# Patient Record
Sex: MALE | Race: BLACK OR AFRICAN AMERICAN | NOT HISPANIC OR LATINO | Employment: OTHER | URBAN - METROPOLITAN AREA
[De-identification: names, ages, dates, MRNs, and addresses within clinical notes are randomized per-mention and may not be internally consistent; named-entity substitution may affect disease eponyms.]

---

## 2022-04-18 PROBLEM — I25.84 CORONARY ARTERY CALCIFICATION: Status: ACTIVE | Noted: 2022-04-18

## 2024-05-20 ENCOUNTER — OFFICE VISIT (OUTPATIENT)
Dept: SLEEP MEDICINE | Facility: MEDICAL CENTER | Age: 69
End: 2024-05-20
Attending: NURSE PRACTITIONER
Payer: MEDICARE

## 2024-05-20 VITALS
BODY MASS INDEX: 24.5 KG/M2 | RESPIRATION RATE: 16 BRPM | DIASTOLIC BLOOD PRESSURE: 68 MMHG | OXYGEN SATURATION: 93 % | WEIGHT: 175 LBS | HEIGHT: 71 IN | SYSTOLIC BLOOD PRESSURE: 110 MMHG | HEART RATE: 93 BPM

## 2024-05-20 DIAGNOSIS — Z87.891 FORMER SMOKER: ICD-10-CM

## 2024-05-20 DIAGNOSIS — J43.9 PULMONARY EMPHYSEMA, UNSPECIFIED EMPHYSEMA TYPE (HCC): ICD-10-CM

## 2024-05-20 DIAGNOSIS — J44.9 CHRONIC OBSTRUCTIVE PULMONARY DISEASE, UNSPECIFIED COPD TYPE (HCC): ICD-10-CM

## 2024-05-20 DIAGNOSIS — G47.33 OSA (OBSTRUCTIVE SLEEP APNEA): ICD-10-CM

## 2024-05-20 PROCEDURE — 99214 OFFICE O/P EST MOD 30 MIN: CPT | Performed by: NURSE PRACTITIONER

## 2024-05-20 PROCEDURE — 3074F SYST BP LT 130 MM HG: CPT | Performed by: NURSE PRACTITIONER

## 2024-05-20 PROCEDURE — 3078F DIAST BP <80 MM HG: CPT | Performed by: NURSE PRACTITIONER

## 2024-05-20 ASSESSMENT — FIBROSIS 4 INDEX: FIB4 SCORE: 2.41

## 2024-05-20 ASSESSMENT — PATIENT HEALTH QUESTIONNAIRE - PHQ9: CLINICAL INTERPRETATION OF PHQ2 SCORE: 0

## 2024-05-20 NOTE — PROGRESS NOTES
Chief Complaint   Patient presents with    Follow-Up     Last seen 03/13/2024 Dr. Schneider  Compliance check       HPI:  Jack Cui is a 69 y.o. year old male here today for follow-up on COPD.  Last seen 3/13/2024 for sleep study results.  Past medical history includes COPD, hypertension, DSL, CAD, and is a former smoker with a 43-pack-year history quit in 2015.  Last seen in pulmonary clinic by Dorothy Sharp on 10/10/2023 .split-night study showed evidence of JUAN JOSE with an AHI of 13.2 and O2 jay of 79%.  Patient was titrated on CPAP.  Order was placed for auto CPAP 6 to 10 cm/H2O with supplemental oxygen bleed in at 2 L/min.     Regards to COPD, patient continues to use Trelegy daily with rare use of his rescue inhaler.  Has completed pulmonary rehab in the past.  Recently received his CPAP and notes improvement in sleep quality and absence of symptoms with use.  Using CPAP with supplemental oxygen bleed in.  Symptomatically, patient only complains of throat congestion and uses Mucinex.  Denies cough, wheezing, chest tightness or new or worsening dyspnea or shortness of breath.  Current mMRC of 1.  Denies any exacerbations since last visit.  Please follow for lung cancer screening program.  Patient is due for CT chest in 1 month.  Order will be placed.    ROS: As per HPI and otherwise negative if not stated.    Past Medical History:   Diagnosis Date    Back pain     Chest tightness     Constipation     Cough     Diarrhea     Dizziness     Frequent headaches     Hyperlipidemia     Hypertension     Morning headache     Ringing in ears     Shortness of breath     Sputum production     Wears glasses     Wheezing        No past surgical history on file.    Family History   Problem Relation Age of Onset    Cancer Mother        Allergies as of 05/20/2024    (No Known Allergies)        Vitals:  /68 (BP Location: Left arm, Patient Position: Sitting, BP Cuff Size: Adult)   Pulse 93   Resp 16   Ht 1.803 m  "(5' 11\")   Wt 79.4 kg (175 lb)   SpO2 93%     Current medications as of today   Current Outpatient Medications   Medication Sig Dispense Refill    fluticasone-umeclidinium-vilanterol (TRELEGY ELLIPTA) 100-62.5-25 mcg/act inhaler Inhale 1 Puff every day. 90 Each 3    amLODIPine (Katerzia, Norvasc) 1 mg/mL oral suspension Take  by mouth every day. Shake well, room temperature      atorvastatin (LIPITOR) 40 MG Tab Take 1 Tablet by mouth every day. 90 Tablet 2    aspirin (ASPIRIN ADULT LOW STRENGTH) 81 MG EC tablet Take 1 Tablet by mouth every day. 90 Tablet 2    zolpidem (AMBIEN) 5 MG Tab Take 1 Tablet by mouth at bedtime as needed for Sleep (For Sleep Study only) for up to 1 dose. 1 tablet makes a one day dose.  Indications: Trouble Sleeping (Patient not taking: Reported on 3/13/2024) 1 Tablet 0    Guaifenesin 1200 MG TABLET SR 12 HR Take 1 Tablet by mouth every 12 hours. (Patient not taking: Reported on 1/10/2024) 28 Tablet 0    albuterol 108 (90 Base) MCG/ACT Aero Soln inhalation aerosol Inhale 1-2 Puffs every four hours as needed for Shortness of Breath. (Patient not taking: Reported on 1/10/2024) 1 Each 3     No current facility-administered medications for this visit.         Physical Exam:   Gen:           Alert and oriented, No apparent distress. Mood and affect appropriate, normal interaction with examiner.  Eyes:          PERRL, EOM intact, sclere white, conjunctive moist.  Ears:          Not examined.   Hearing:     Grossly intact.  Nose:          Normal, no lesions or deformities.  Dentition:    Good dentition.  Oropharynx:   Tongue normal, posterior pharynx without erythema or exudate.  Neck:        Supple, trachea midline, no masses.  Respiratory Effort: No intercostal retractions or use of accessory muscles.   Lung Auscultation:      Clear to auscultation bilaterally; no rales, rhonchi or wheezing.  CV:            Regular rate and rhythm. No murmurs, rubs or gallops.  Abd:           Not examined. "   Lymphadenopathy: Not examined.  Gait and Station: Normal.  Digits and Nails: No clubbing, cyanosis, petechiae, or nodes.   Cranial Nerves: II-XII grossly intact.  Skin:        No rashes, lesions or ulcers noted.               Ext:           No cyanosis or edema.      Assessment:  1. JUAN JOSE (obstructive sleep apnea)  DME Mask and Supplies      2. Chronic obstructive pulmonary disease, unspecified COPD type (HCC)  CT-LUNG CANCER-SCREENING      3. Pulmonary emphysema, unspecified emphysema type (HCC)  CT-LUNG CANCER-SCREENING      4. Former smoker  CT-LUNG CANCER-SCREENING        Plan:  Patient recently issued CPAP.  Follow-up in 2 days for first compliance check with Dr. Schneider.  Continues to use and benefit from Trelegy and albuterol if needed.  Also uses Mucinex for congestion.  Order placed for CT chest due in 1 month.  Patient will follow-up in 6 months.  Medication and inhaler use discussed in detail with patient.  Denies any new or worsening dyspnea or shortness of breath or exacerbations since last visit.    Please note that this dictation was created using voice recognition software. I have made every reasonable attempt to correct obvious errors, but it is possible there are errors of grammar and possibly content that I did not discover before finalizing the note.

## 2024-05-21 ENCOUNTER — TELEPHONE (OUTPATIENT)
Dept: HEALTH INFORMATION MANAGEMENT | Facility: OTHER | Age: 69
End: 2024-05-21

## 2024-05-22 ENCOUNTER — OFFICE VISIT (OUTPATIENT)
Dept: SLEEP MEDICINE | Facility: MEDICAL CENTER | Age: 69
End: 2024-05-22
Attending: PREVENTIVE MEDICINE
Payer: MEDICARE

## 2024-05-22 VITALS
DIASTOLIC BLOOD PRESSURE: 68 MMHG | BODY MASS INDEX: 24.5 KG/M2 | SYSTOLIC BLOOD PRESSURE: 120 MMHG | HEIGHT: 71 IN | WEIGHT: 175 LBS | HEART RATE: 78 BPM | OXYGEN SATURATION: 95 % | RESPIRATION RATE: 18 BRPM

## 2024-05-22 ASSESSMENT — FIBROSIS 4 INDEX: FIB4 SCORE: 2.41

## 2024-07-19 PROBLEM — R65.20 SEPSIS WITH ACUTE HYPOXIC RESPIRATORY FAILURE WITHOUT SEPTIC SHOCK (HCC): Status: RESOLVED | Noted: 2023-06-05 | Resolved: 2024-07-19

## 2024-07-19 PROBLEM — J96.01 SEPSIS WITH ACUTE HYPOXIC RESPIRATORY FAILURE WITHOUT SEPTIC SHOCK (HCC): Status: RESOLVED | Noted: 2023-06-05 | Resolved: 2024-07-19

## 2024-07-19 PROBLEM — N17.9 AKI (ACUTE KIDNEY INJURY) (HCC): Status: RESOLVED | Noted: 2023-06-05 | Resolved: 2024-07-19

## 2024-07-19 PROBLEM — A41.9 SEPSIS WITH ACUTE HYPOXIC RESPIRATORY FAILURE WITHOUT SEPTIC SHOCK (HCC): Status: RESOLVED | Noted: 2023-06-05 | Resolved: 2024-07-19

## 2024-07-19 PROBLEM — J96.01 ACUTE HYPOXEMIC RESPIRATORY FAILURE (HCC): Status: RESOLVED | Noted: 2023-06-06 | Resolved: 2024-07-19

## 2024-07-22 ASSESSMENT — ENCOUNTER SYMPTOMS: GENERAL WELL-BEING: GOOD

## 2024-07-22 ASSESSMENT — PATIENT HEALTH QUESTIONNAIRE - PHQ9
1. LITTLE INTEREST OR PLEASURE IN DOING THINGS: NOT AT ALL
2. FEELING DOWN, DEPRESSED, IRRITABLE, OR HOPELESS: NOT AT ALL

## 2024-07-22 ASSESSMENT — ACTIVITIES OF DAILY LIVING (ADL): BATHING_REQUIRES_ASSISTANCE: 0

## 2024-07-23 ASSESSMENT — PATIENT HEALTH QUESTIONNAIRE - PHQ9: CLINICAL INTERPRETATION OF PHQ2 SCORE: 0

## 2024-07-24 VITALS
SYSTOLIC BLOOD PRESSURE: 118 MMHG | WEIGHT: 175 LBS | DIASTOLIC BLOOD PRESSURE: 60 MMHG | HEIGHT: 71 IN | BODY MASS INDEX: 24.5 KG/M2

## 2024-07-24 DIAGNOSIS — J96.11 CHRONIC RESPIRATORY FAILURE WITH HYPOXIA (HCC): ICD-10-CM

## 2024-07-24 DIAGNOSIS — Z87.01 HISTORY OF COMMUNITY ACQUIRED PNEUMONIA: ICD-10-CM

## 2024-07-24 DIAGNOSIS — J43.9 PULMONARY EMPHYSEMA, UNSPECIFIED EMPHYSEMA TYPE (HCC): ICD-10-CM

## 2024-07-24 DIAGNOSIS — I25.10 CORONARY ARTERY CALCIFICATION: ICD-10-CM

## 2024-07-24 DIAGNOSIS — I25.84 CORONARY ARTERY CALCIFICATION: ICD-10-CM

## 2024-07-24 DIAGNOSIS — I10 ESSENTIAL HYPERTENSION: ICD-10-CM

## 2024-07-24 DIAGNOSIS — Z12.12 SCREENING FOR COLORECTAL CANCER: ICD-10-CM

## 2024-07-24 DIAGNOSIS — Z12.11 SCREENING FOR COLORECTAL CANCER: ICD-10-CM

## 2024-07-24 DIAGNOSIS — E78.5 DYSLIPIDEMIA: ICD-10-CM

## 2024-07-24 PROCEDURE — 3074F SYST BP LT 130 MM HG: CPT

## 2024-07-24 PROCEDURE — 1126F AMNT PAIN NOTED NONE PRSNT: CPT

## 2024-07-24 PROCEDURE — 3078F DIAST BP <80 MM HG: CPT

## 2024-07-24 PROCEDURE — G0439 PPPS, SUBSEQ VISIT: HCPCS

## 2024-07-24 SDOH — ECONOMIC STABILITY: FOOD INSECURITY: WITHIN THE PAST 12 MONTHS, THE FOOD YOU BOUGHT JUST DIDN'T LAST AND YOU DIDN'T HAVE MONEY TO GET MORE.: NEVER TRUE

## 2024-07-24 SDOH — ECONOMIC STABILITY: TRANSPORTATION INSECURITY
IN THE PAST 12 MONTHS, HAS THE LACK OF TRANSPORTATION KEPT YOU FROM MEDICAL APPOINTMENTS OR FROM GETTING MEDICATIONS?: NO

## 2024-07-24 SDOH — ECONOMIC STABILITY: FOOD INSECURITY: WITHIN THE PAST 12 MONTHS, YOU WORRIED THAT YOUR FOOD WOULD RUN OUT BEFORE YOU GOT MONEY TO BUY MORE.: NEVER TRUE

## 2024-07-24 SDOH — ECONOMIC STABILITY: TRANSPORTATION INSECURITY
IN THE PAST 12 MONTHS, HAS LACK OF TRANSPORTATION KEPT YOU FROM MEETINGS, WORK, OR FROM GETTING THINGS NEEDED FOR DAILY LIVING?: NO

## 2024-07-24 SDOH — ECONOMIC STABILITY: INCOME INSECURITY: HOW HARD IS IT FOR YOU TO PAY FOR THE VERY BASICS LIKE FOOD, HOUSING, MEDICAL CARE, AND HEATING?: NOT HARD AT ALL

## 2024-07-24 ASSESSMENT — FIBROSIS 4 INDEX: FIB4 SCORE: 2.41

## 2024-07-24 ASSESSMENT — PAIN SCALES - GENERAL: PAINLEVEL: NO PAIN

## 2024-09-19 ENCOUNTER — HOSPITAL ENCOUNTER (OUTPATIENT)
Dept: LAB | Facility: MEDICAL CENTER | Age: 69
End: 2024-09-19
Attending: FAMILY MEDICINE
Payer: MEDICARE

## 2024-09-19 PROCEDURE — 80061 LIPID PANEL: CPT

## 2024-09-19 PROCEDURE — 80053 COMPREHEN METABOLIC PANEL: CPT

## 2024-09-19 PROCEDURE — 36415 COLL VENOUS BLD VENIPUNCTURE: CPT

## 2024-09-20 LAB
ALBUMIN SERPL BCP-MCNC: 4.1 G/DL (ref 3.2–4.9)
ALBUMIN/GLOB SERPL: 1.5 G/DL
ALP SERPL-CCNC: 152 U/L (ref 30–99)
ALT SERPL-CCNC: 26 U/L (ref 2–50)
ANION GAP SERPL CALC-SCNC: 10 MMOL/L (ref 7–16)
AST SERPL-CCNC: 29 U/L (ref 12–45)
BILIRUB SERPL-MCNC: 0.9 MG/DL (ref 0.1–1.5)
BUN SERPL-MCNC: 12 MG/DL (ref 8–22)
CALCIUM ALBUM COR SERPL-MCNC: 9.5 MG/DL (ref 8.5–10.5)
CALCIUM SERPL-MCNC: 9.6 MG/DL (ref 8.5–10.5)
CHLORIDE SERPL-SCNC: 103 MMOL/L (ref 96–112)
CHOLEST SERPL-MCNC: 149 MG/DL (ref 100–199)
CO2 SERPL-SCNC: 25 MMOL/L (ref 20–33)
CREAT SERPL-MCNC: 1.22 MG/DL (ref 0.5–1.4)
FASTING STATUS PATIENT QL REPORTED: NORMAL
GFR SERPLBLD CREATININE-BSD FMLA CKD-EPI: 64 ML/MIN/1.73 M 2
GLOBULIN SER CALC-MCNC: 2.8 G/DL (ref 1.9–3.5)
GLUCOSE SERPL-MCNC: 98 MG/DL (ref 65–99)
HDLC SERPL-MCNC: 68 MG/DL
LDLC SERPL CALC-MCNC: 71 MG/DL
POTASSIUM SERPL-SCNC: 4.3 MMOL/L (ref 3.6–5.5)
PROT SERPL-MCNC: 6.9 G/DL (ref 6–8.2)
SODIUM SERPL-SCNC: 138 MMOL/L (ref 135–145)
TRIGL SERPL-MCNC: 50 MG/DL (ref 0–149)

## 2024-10-05 DIAGNOSIS — J43.9 PULMONARY EMPHYSEMA, UNSPECIFIED EMPHYSEMA TYPE (HCC): ICD-10-CM

## 2024-10-08 RX ORDER — FLUTICASONE FUROATE, UMECLIDINIUM BROMIDE AND VILANTEROL TRIFENATATE 100; 62.5; 25 UG/1; UG/1; UG/1
1 POWDER RESPIRATORY (INHALATION)
Qty: 180 EACH | Refills: 3 | Status: SHIPPED | OUTPATIENT
Start: 2024-10-08

## 2024-11-13 ASSESSMENT — ENCOUNTER SYMPTOMS
MYALGIAS: 0
HEMOPTYSIS: 0
VOMITING: 0
DIAPHORESIS: 0
WEAKNESS: 0
COUGH: 0
HEARTBURN: 0
DIZZINESS: 0
SINUS PAIN: 0
PALPITATIONS: 0
FALLS: 0
CHILLS: 0
HEADACHES: 0
NAUSEA: 0
SHORTNESS OF BREATH: 0
WHEEZING: 0
FEVER: 0
SPUTUM PRODUCTION: 0
DIARRHEA: 0

## 2024-11-14 NOTE — PROGRESS NOTES
Pulmonary Clinic Note    Date of Visit: 11/20/2024     Chief Complaint:  No chief complaint on file.    HPI:   Sumit Cui is a very pleasant 68 y.o. year old male former smoker (43 pack-years, quit in 2015), with a PMHx of COPD, HTN, DSL, CAD who presented to the Pulmonary Clinic for a hospital follow up. Last seen in the office on 2/13/2023 with WILI Naidu and was seen in the hospital on 5/20/2024 with WILI Martinez.     Patient is followed by pulmonary office for COPD.  PFTs in 2022 show an FVC of 2.60 L or 71%, FEV1 1.21 L or 43%, FEV1/FVC 47%, %, TLC 92%, DLCO 49% predicted, without positive bronchodilator response.  PFTs in July 2023 show an FVC of 2.44 L or 67%, FEV1 1.14 L or 41%, FEV1/FVC 47%, %, TLC 90%, DLCO 47% predicted, with out positive bronchodilator response but positive mid flow rate response.  Patient is currently on Trelegy 100 and albuterol as needed.  Patient was hospitalized in June 2023 for sepsis secondary to CAP.  He was treated with broad-spectrum ABX and was given a short course of prednisone x5 days.  He was also hypoxic during his stay and was treated with supplemental oxygen.  He was discharged with 4 LPM of oxygen and Symbicort and Spiriva.  CT chest in June 2023 shows right lower lobe pneumonia and emphysema.  Blood cultures, viral panel, strep, and Legionella negative.      Interval events:  6/20/2023-  Patient states he titrated himself off oxygen but does not monitor saturations at home.  Symptomatically, he denies any significant shortness of breath, cough, sputum duction, or wheezing.  He continues to use Symbicort and Spiriva with good effect and uses DuoNebs 3 times a day.      10/10/2023-  Patient his breathing symptoms have dramatically improved with the use of Trelegy.  Symptomatically, patient denies any significant shortness of breath, cough, sputum production, or wheezing.  He has not needed albuterol or DuoNebs.  He also is not using any  oxygen therapy.  Multi ox today in the office does show the need for 2 LPM of oxygen on exertion.  He does have an evaluation with the sleep center in April.  He also starts pulmonary rehab next month.    2024-  ***    Exacerbations this year:  1 (2023)    Current medication regimen: Trelegy 100, DuoNeb, albuterol    Oxygen use:  None    MMRC Grade:   0- Breathless only during strenuous exercise  1- Short of breath when hurrying or going up a small hill  2- Walks slower than friends due to breathlessness, has to stop at own pace  3- Stops to catch breath on level ground after 100m  4- Breathless with ambulating around house or ADLs            Past Medical History:   Diagnosis Date    Acute hypoxemic respiratory failure ([facility]) 2023    JUAN CARLOS (acute kidney injury) ([facility]) 2023    Back pain     Chest tightness     Constipation     Cough     Diarrhea     Dizziness     Frequent headaches     Hyperlipidemia     Hypertension     Morning headache     Ringing in ears     Sepsis with acute hypoxic respiratory failure without septic shock ([facility]) 2023    Shortness of breath     Sputum production     Wears glasses     Wheezing      No past surgical history on file.  Social History     Socioeconomic History    Marital status:      Spouse name: Not on file    Number of children: Not on file    Years of education: Not on file    Highest education level: Not on file   Occupational History    Not on file   Tobacco Use    Smoking status: Former     Current packs/day: 0.00     Average packs/day: 1 pack/day for 43.0 years (43.0 ttl pk-yrs)     Types: Cigarettes, Electronic Cigarettes     Start date: 1972     Quit date: 2015     Years since quittin.2    Smokeless tobacco: Former   Vaping Use    Vaping status: Former    Quit date: 2016    Passive vaping exposure: Yes   Substance and Sexual Activity    Alcohol use: Not Currently     Comment: No alcohol use for the last 39 years    Drug use:  Never    Sexual activity: Not on file   Other Topics Concern    Not on file   Social History Narrative    Not on file     Social Drivers of Health     Financial Resource Strain: Low Risk  (7/24/2024)    Overall Financial Resource Strain (CARDIA)     Difficulty of Paying Living Expenses: Not hard at all   Food Insecurity: No Food Insecurity (7/24/2024)    Hunger Vital Sign     Worried About Running Out of Food in the Last Year: Never true     Ran Out of Food in the Last Year: Never true   Transportation Needs: No Transportation Needs (7/24/2024)    PRAPARE - Transportation     Lack of Transportation (Medical): No     Lack of Transportation (Non-Medical): No   Physical Activity: Not on file   Stress: Not on file   Social Connections: Not on file   Intimate Partner Violence: Not on file   Housing Stability: Not on file        Family History   Problem Relation Age of Onset    Cancer Mother      Current Outpatient Medications on File Prior to Visit   Medication Sig Dispense Refill    TRELEGY ELLIPTA 100-62.5-25 MCG/ACT inhaler INHALE 1 PUFF BY MOUTH EVERY  Each 3    amLODIPine (Katerzia, Norvasc) 1 mg/mL oral suspension Take  by mouth every day. Shake well, room temperature      Guaifenesin 1200 MG TABLET SR 12 HR Take 1 Tablet by mouth every 12 hours. (Patient not taking: Reported on 1/10/2024) 28 Tablet 0    albuterol 108 (90 Base) MCG/ACT Aero Soln inhalation aerosol Inhale 1-2 Puffs every four hours as needed for Shortness of Breath. (Patient not taking: Reported on 1/10/2024) 1 Each 3    atorvastatin (LIPITOR) 40 MG Tab Take 1 Tablet by mouth every day. 90 Tablet 2    aspirin (ASPIRIN ADULT LOW STRENGTH) 81 MG EC tablet Take 1 Tablet by mouth every day. 90 Tablet 2     No current facility-administered medications on file prior to visit.     Allergies: Patient has no known allergies.    ROS:   Review of Systems   Constitutional:  Negative for chills, diaphoresis, fever and malaise/fatigue.   HENT:  Negative  for congestion and sinus pain.    Respiratory:  Negative for cough, hemoptysis, sputum production, shortness of breath and wheezing.    Cardiovascular:  Negative for chest pain, palpitations and leg swelling.   Gastrointestinal:  Negative for diarrhea, heartburn, nausea and vomiting.   Musculoskeletal:  Negative for falls and myalgias.   Neurological:  Negative for dizziness, weakness and headaches.     Vitals:  There were no vitals taken for this visit.    Physical Exam  Constitutional:       General: He is not in acute distress.     Appearance: Normal appearance. He is not ill-appearing, toxic-appearing or diaphoretic.   Cardiovascular:      Rate and Rhythm: Normal rate and regular rhythm.      Heart sounds: No murmur heard.     No friction rub. No gallop.   Pulmonary:      Effort: No respiratory distress.      Breath sounds: Normal breath sounds. No stridor. No wheezing, rhonchi or rales.   Musculoskeletal:         General: No swelling.      Right lower leg: No edema.      Left lower leg: No edema.   Skin:     General: Skin is warm.   Neurological:      General: No focal deficit present.      Mental Status: He is alert and oriented to person, place, and time.   Psychiatric:         Mood and Affect: Mood normal.         Behavior: Behavior normal.         Thought Content: Thought content normal.         Judgment: Judgment normal.         Laboratory Data:  Multioximetry (Date: 10/10/2023)-       PFTs: (Date: 3/31/2022)-      Impression:  1.  Spirometry is significant for a severe obstruction  2.  There is no significant change postbronchodilator.  There is a suggestion of bronchial reactivity in the mid flow values which can be seen in reactive airways disease, correlate clinically  3.  The flow-volume loop is consistent with the spirometry data  4.  Lung volumes demonstrate air trapping without hyperinflation  5.  The diffusion capacity is moderately decreased and does not fully correct for alveolar volume  6.  In  comparison to the prior study from 3/21/2022 the FEV1 has declined from 1.21 1.14 L.     CT Chest: (Date: 6/5/2023)-  Impression:  1.  Right lower lobe pneumonia.  2.  Emphysematous changes.  3.  No pleural effusion.  4.  Extensive coronary calcification.    ECHO: (Date: 6/7/2023)-  Impression:  Prior study 11/16/21, compared to the report of the prior study, there has been no significant change.   Normal left ventricular systolic function.  The left ventricular ejection fraction is visually estimated to be 65%.  No significant valvular abnormalities.       Assessment and Plan:    Problem List Items Addressed This Visit    None      Diagnostic studies have been reviewed with the patient.    No follow-ups on file.     This note was generated using voice recognition software which has a chance of producing errors of grammar and possibly content.  I have made every reasonable attempt to find and correct any obvious errors, but it should be expected that some may not be found prior to finalization of this note.    Time spent in record review prior to patient arrival, reviewing results, and in face-to-face encounter totaled 28 min.  __________  WILI Edward  Pulmonary Medicine  [facility]

## 2024-11-20 ENCOUNTER — APPOINTMENT (OUTPATIENT)
Dept: SLEEP MEDICINE | Facility: MEDICAL CENTER | Age: 69
End: 2024-11-20
Payer: MEDICARE

## 2025-01-08 ENCOUNTER — OFFICE VISIT (OUTPATIENT)
Dept: SLEEP MEDICINE | Facility: MEDICAL CENTER | Age: 70
End: 2025-01-08
Attending: STUDENT IN AN ORGANIZED HEALTH CARE EDUCATION/TRAINING PROGRAM
Payer: MEDICARE

## 2025-01-08 VITALS
DIASTOLIC BLOOD PRESSURE: 70 MMHG | BODY MASS INDEX: 22.76 KG/M2 | RESPIRATION RATE: 16 BRPM | HEART RATE: 87 BPM | WEIGHT: 162.6 LBS | OXYGEN SATURATION: 94 % | SYSTOLIC BLOOD PRESSURE: 116 MMHG | HEIGHT: 71 IN

## 2025-01-08 DIAGNOSIS — G47.33 OSA (OBSTRUCTIVE SLEEP APNEA): ICD-10-CM

## 2025-01-08 DIAGNOSIS — J44.9 CHRONIC OBSTRUCTIVE PULMONARY DISEASE, UNSPECIFIED COPD TYPE (HCC): ICD-10-CM

## 2025-01-08 PROCEDURE — 99213 OFFICE O/P EST LOW 20 MIN: CPT | Performed by: STUDENT IN AN ORGANIZED HEALTH CARE EDUCATION/TRAINING PROGRAM

## 2025-01-08 PROCEDURE — 99214 OFFICE O/P EST MOD 30 MIN: CPT | Performed by: STUDENT IN AN ORGANIZED HEALTH CARE EDUCATION/TRAINING PROGRAM

## 2025-01-08 PROCEDURE — 3078F DIAST BP <80 MM HG: CPT | Performed by: STUDENT IN AN ORGANIZED HEALTH CARE EDUCATION/TRAINING PROGRAM

## 2025-01-08 PROCEDURE — 3074F SYST BP LT 130 MM HG: CPT | Performed by: STUDENT IN AN ORGANIZED HEALTH CARE EDUCATION/TRAINING PROGRAM

## 2025-01-08 ASSESSMENT — FIBROSIS 4 INDEX: FIB4 SCORE: 2.1

## 2025-01-08 NOTE — PROGRESS NOTES
[facility] Follow-up Visit    CC: JUAN JOSE f/u    HPI:  Jack Cui is a 69 y.o.male  with (43 pyh quit in 2015), COPD, hypertension, CAD, JUAN JOSE    Last seen by Joseph Rg 5/20/2024 for JUAN JOSE and COPD.  At that time patient noted to have symptomatic benefit from nightly use of CPAP.    Auto CPAP 6-10 cmH2O with 2 LPM supplemental O2 was ordered    Machine: AirSense 10 auto CPAP, 6-10 cmH2O  Date: 12/8/2024 to 1/6/2025  Time used: 47% over 4 hours used in the last 30 days, 3 hours and 54 minutes average usage  Mask: [id number]  DME: [employer]  95th% Leak: 27.8 LPM  95th% pressure: 9.7  Residual AHI: 2.7  Occasional dry mouth/throat  Falls asleep with it on.   Bedtime: falls asleep in chair 11p-2a  Wake up: 8- 8:30 am   Sleeps through night    He does note that his oxygen drops when he is exerting himself  The pressure feels okay, however doesn't feel supported enough by pressures.   Still has headaches in the morning and some residual symptoms.    Feels that it is cold through his nose.    Sleep History  2/13/2024 -split-night PSG -AHI 13.2, jay SpO2 79%, 108.9 minutes of TST below 88%.  CPAP was initiated and tried from 6-9 cmH2O.       Patient Active Problem List    Diagnosis Date Noted    Screening for colorectal cancer 07/24/2024    Chronic respiratory failure with hypoxia (HCC) 06/20/2023    History of community acquired pneumonia 06/05/2023    Pulmonary emphysema (HCC) 04/18/2022    Coronary artery calcification 04/18/2022    Essential hypertension 08/03/2021    Dyslipidemia 08/03/2021       Past Medical History:   Diagnosis Date    Acute hypoxemic respiratory failure (HCC) 06/06/2023    JUAN CARLOS (acute kidney injury) (HCC) 06/05/2023    Back pain     Chest tightness     Constipation     Cough     Diarrhea     Dizziness     Frequent headaches     Hyperlipidemia     Hypertension     Morning headache     Ringing in ears     Sepsis with acute hypoxic respiratory failure without septic shock (HCC) 06/05/2023     Shortness of breath     Sputum production     Wears glasses     Wheezing         History reviewed. No pertinent surgical history.    Family History   Problem Relation Age of Onset    Cancer Mother        Social History     Socioeconomic History    Marital status:      Spouse name: Not on file    Number of children: Not on file    Years of education: Not on file    Highest education level: Not on file   Occupational History    Not on file   Tobacco Use    Smoking status: Former     Current packs/day: 0.00     Average packs/day: 1 pack/day for 43.0 years (43.0 ttl pk-yrs)     Types: Cigarettes, Electronic Cigarettes     Start date: 1972     Quit date: 2015     Years since quittin.4    Smokeless tobacco: Former   Vaping Use    Vaping status: Former    Quit date: 2016    Passive vaping exposure: Yes   Substance and Sexual Activity    Alcohol use: Not Currently     Comment: No alcohol use for the last 39 years    Drug use: Never    Sexual activity: Not on file   Other Topics Concern    Not on file   Social History Narrative    Not on file     Social Drivers of Health     Financial Resource Strain: Low Risk  (2024)    Overall Financial Resource Strain (CARDIA)     Difficulty of Paying Living Expenses: Not hard at all   Food Insecurity: No Food Insecurity (2024)    Hunger Vital Sign     Worried About Running Out of Food in the Last Year: Never true     Ran Out of Food in the Last Year: Never true   Transportation Needs: No Transportation Needs (2024)    PRAPARE - Transportation     Lack of Transportation (Medical): No     Lack of Transportation (Non-Medical): No   Physical Activity: Not on file   Stress: Not on file   Social Connections: Not on file   Intimate Partner Violence: Not on file   Housing Stability: Not on file       Current Outpatient Medications   Medication Sig Dispense Refill    TRECARMEN ELLIPTA 100-62.5-25 MCG/ACT inhaler INHALE 1 PUFF BY MOUTH EVERY  Each 3     "amLODIPine (Katerzia, Norvasc) 1 mg/mL oral suspension Take  by mouth every day. Shake well, room temperature      atorvastatin (LIPITOR) 40 MG Tab Take 1 Tablet by mouth every day. 90 Tablet 2    aspirin (ASPIRIN ADULT LOW STRENGTH) 81 MG EC tablet Take 1 Tablet by mouth every day. 90 Tablet 2    Guaifenesin 1200 MG TABLET SR 12 HR Take 1 Tablet by mouth every 12 hours. (Patient not taking: Reported on 1/8/2025) 28 Tablet 0    albuterol 108 (90 Base) MCG/ACT Aero Soln inhalation aerosol Inhale 1-2 Puffs every four hours as needed for Shortness of Breath. (Patient not taking: Reported on 1/8/2025) 1 Each 3     No current facility-administered medications for this visit.        ALLERGIES: Patient has no known allergies.    ROS  Constitutional: Denies fevers, Denies weight changes  Ears/Nose/Throat/Mouth: Denies nasal congestion or sore throat   Cardiovascular: Denies chest pain  Respiratory: Denies shortness of breath, Denies cough  Gastrointestinal/Hepatic: Denies nausea, vomiting  Sleep: see HPI      PHYSICAL EXAM  /70 (BP Location: Left arm, Patient Position: Sitting, BP Cuff Size: Adult)   Pulse 87   Resp 16   Ht 1.803 m (5' 11\")   Wt 73.8 kg (162 lb 9.6 oz)   SpO2 94%   BMI 22.68 kg/m²   Appearance: Well-nourished, well-developed, no acute distress  Eyes:  No scleral icterus , EOMI  Musculoskeletal:  Grossly normal; gait and station normal; digits and nails normal  Skin:  No rashes, petechiae, cyanosis  Neurologic: without focal signs; oriented to person, time, place, and purpose; judgement intact    Spirometry:  FVC is 2.44 L which is 67% predicted without a significant change postbronchodilator  FEV1 is 1.14 L which is 41% predicted without a significant change postbronchodilator  FEV1/FVC is 49    Lung volumes:  TLC is 6.37 L which is 90% predicted  RV is 3.77 L which is 154% predicted     Diffusion capacity:  DLCO is 12.24 which is 47% predicted  DL/VA is 3.10 which is 83% predicted    Medical " Decision Making   Assessment and Plan  JUAN JOSE/COPD overlap -given severity of COPD/emphysema, suspect patient not entirely controlled for hypercapnia based on his symptomatology.  Would thus recommend in-lab CPAP/BiPAP titration as.  Patient may need more advanced PAP modality in addition to ongoing supplemental oxygen    The medical record was reviewed.  Diagnostic and titration nocturnal polysomnogram's, home sleep apnea tests, continuous nocturnal oximetry results, multiple sleep latency tests, and compliance reports reviewed.  Compliance data reviewed showing 47% usage > 4hours in last 30  days. Average AHI 2.7 events/hour. Pt continues to use and benefit from machine.  However does note difficulties with pressure and residual headaches in the morning.  Thus concern for hypercapnia and need for noninvasive ventilation    Current Settings 6-10 cmH2O    PLAN:   -CPAP/BiPAP titration PSG ordered with transcutaneous CO2 monitoring to ensure resolution of presumed hypercapnia  -Advised increasing humidification plus or minus adding chinstrap  -Recommend increasing usage to drive benefits  -Order placed for mask and supplies   -Advised to reach out via MyChart with questions     Has been advised to continue the current settings(for now), increase usage, clean equipment frequently, and get new mask and supplies as allowed by insurance and DME. Recommend an earlier appointment, if significant treatment barriers develop.    Patients with JUAN JOSE are at increased risk of cardiovascular disease including coronary artery disease, systemic arterial hypertension, pulmonary arterial hypertension, cardiac arrythmias, and stroke. The patient was advised to avoid driving a motor vehicle when     Positive airway pressure will favorably impact many of the adverse conditions and effects provoked by JUAN JOSE.    Have advised the patient to follow up with the appropriate healthcare practitioners for all other medical problems and  issues.    Return in about 6 weeks (around 2/19/2025) for sleep study results.      Please note portions of this record was created using voice recognition software. I have made every reasonable attempt to correct obvious errors, but I expect that there are errors of grammar and possibly content I did not discover before finalizing the note.

## 2025-01-31 ENCOUNTER — PATIENT MESSAGE (OUTPATIENT)
Dept: SLEEP MEDICINE | Facility: MEDICAL CENTER | Age: 70
End: 2025-01-31
Payer: MEDICARE

## 2025-02-03 ENCOUNTER — APPOINTMENT (OUTPATIENT)
Dept: SLEEP MEDICINE | Facility: MEDICAL CENTER | Age: 70
End: 2025-02-03
Attending: STUDENT IN AN ORGANIZED HEALTH CARE EDUCATION/TRAINING PROGRAM
Payer: MEDICARE

## 2025-02-03 DIAGNOSIS — G47.34 NOCTURNAL HYPOXEMIA: ICD-10-CM

## 2025-02-03 DIAGNOSIS — G47.33 OSA (OBSTRUCTIVE SLEEP APNEA): ICD-10-CM

## 2025-02-03 DIAGNOSIS — J44.9 CHRONIC OBSTRUCTIVE PULMONARY DISEASE, UNSPECIFIED COPD TYPE (HCC): ICD-10-CM

## 2025-02-03 PROCEDURE — 95811 POLYSOM 6/>YRS CPAP 4/> PARM: CPT | Performed by: STUDENT IN AN ORGANIZED HEALTH CARE EDUCATION/TRAINING PROGRAM

## 2025-02-04 NOTE — PROCEDURES
Physician:   Patient: CHRISTINA KHAN  ID: [id number] Date: 2/3/2025 Exam No.:   MONTAGE: Standard  STUDY TYPE: Treatment  RECORDING TECHNIQUE:   After the scalp was prepared, gold plated electrodes were applied to the scalp according to the International 10-20 System. EEG (electroencephalogram) was continuously monitored from the O1-M2, O2-M1, C3-M2, C4-M1, F3-M2, and F4-M1. EOGs (electrooculograms) were monitored by electrodes placed at the left and right outer canthi. Chin EMG (electromyogram) was monitored by electrodes placed on the mentalis and sub-mentalis muscles. Nasal and oral airflow were monitored using a triple port thermocouple as well as oronasal pressure transducer. Respiratory effort was measured by inductive plethysmography technology employing abdominal and thoracic belts. Blood oxygen saturation and pulse were monitored by pulse oximetry. Heart rhythm was monitored by surface electrocardiogram. Leg EMG was studied using surface electrodes placed on left and right anterior tibialis. A microphone was used to monitor tracheal sounds and snoring. Body position was monitored and documented by technician observation.   SCORING CRITERIA:   A modification of the AASM manual for scoring of sleep and associated events was used. Obstructive apneas were scored by cessation of airflow for at least 10 seconds with continuing respiratory effort. Central apneas were scored by cessation of airflow for at least 10 seconds with no respiratory effort. Hypopneas were scored by a 30% or more reduction in airflow for at least 10 seconds accompanied by arterial oxygen desaturation of 3% or an arousal. For CMS (Medicare) patients, per AASM rule 1B, hypopneas are scored by 30% with mild reduction in airflow for at least 10 seconds accompanied by arterial saturation decreased at 4%.  Study start time was 08:35:53 PM. Diagnostic recording time was 9h 26.0m with a total sleep time of 4h 39.0m resulting in a sleep efficiency of  49.29%%. Sleep latency from the start of the study was 105 minutes and the latency from sleep to REM was 172 minutes. In total,28 arousals were scored for an arousal index of 6.0.  Respiratory:  There were a total of 7 apneas consisting of 0 obstructive apneas, 0 mixed apneas, and 7 central apneas. A total of 40 hypopneas were scored. The apnea index was 1.51 per hour and the hypopnea index was 8.60 per hour resulting in an overall AHI of 10.11. AHI during REM was 16.8 and AHI while supine was 12.72.  Oximetry:  There was a mean oxygen saturation of 88.0%. The minimum oxygen saturation in NREM was 79.0 % and in REM was 78.0%. The patient spent 229.0 minutes of TST with SaO2 <88%.  Cardiac:  The highest heart rate seen while awake was 97 BPM while the highest heart rate during sleep was 97 BPM with an average sleeping heart rate of 59 BPM.  Limb Movements:  There were a total of 0 PLMs during sleep which resulted in a PLMS index of 0.0. Of these, 0 were associated with arousals which resulted in a PLMS arousal index of 0.0.  Titration: CPAP was tried from 5-8cm H2O. Bipap was tried from 10/6-16/12cm H2O.  This was a fully attended sleep study. This test was technically adequate. This patient was titrated on CPAP starting at 5 cm of water pressure. Patient was titrated up to 8 cm of water pressure. BiPAP was then tried 10/6-16/12 cmH20. Patient did best on bipap 12/8 cm of water pressure. Patient spent 63 minutes at that pressure and the AHI was 1 which is considered treated obstructive sleep apnea.   Assessment:   Mild Obstructive Sleep Apnea Hypopnea - AHI 10.11 with severe Nocturnal desaturation - jay saturation 78% - saturations <88% below for 229.0 minutes of TST.  Impression:  CPAP and BiPAP were tried  Prolonged SOL, increased WASO  Supine REM seen on therapy  Patient did not meet criteria for sleep hypoventilation  Patient did best on BiPAP therapy  Despite control of obstructive events, patient had residual  hypoxemia  Recommendation:   autoBiPAP 16/8, PS:4 with 2LPM supplemental O2 bleed in with heated tubing and proper mask fit  Patient used M airfit N30, however due to mask leak, swtiched F30i  I also recommend 30 day compliance download to assess the efficacy to the recommended pressure, measure leak, apnea hypopnea index and compliance for further outpatient monitoring and management of PAP therapy. In some cases alternative treatment options may be proven effective in resolving sleep apnea. These options include upper airway surgery, the use of a dental orthotic, weight loss, or positional therapy. Clinical correlation is required. In general patients with sleep apnea are advised to avoid alcohol, sedatives and not to operate a motor vehicle while drowsy. Untreated sleep apnea increases the risk for cardiovascular and neurovascular disease.

## 2025-02-24 ENCOUNTER — TELEPHONE (OUTPATIENT)
Dept: HEALTH INFORMATION MANAGEMENT | Facility: OTHER | Age: 70
End: 2025-02-24
Payer: MEDICARE

## 2025-03-04 ENCOUNTER — OFFICE VISIT (OUTPATIENT)
Dept: SLEEP MEDICINE | Facility: MEDICAL CENTER | Age: 70
End: 2025-03-04
Attending: STUDENT IN AN ORGANIZED HEALTH CARE EDUCATION/TRAINING PROGRAM
Payer: MEDICARE

## 2025-03-04 VITALS
BODY MASS INDEX: 22.68 KG/M2 | HEIGHT: 71 IN | OXYGEN SATURATION: 94 % | WEIGHT: 162 LBS | RESPIRATION RATE: 16 BRPM | HEART RATE: 87 BPM | SYSTOLIC BLOOD PRESSURE: 120 MMHG | DIASTOLIC BLOOD PRESSURE: 78 MMHG

## 2025-03-04 DIAGNOSIS — G47.33 OSA (OBSTRUCTIVE SLEEP APNEA): ICD-10-CM

## 2025-03-04 PROCEDURE — 99214 OFFICE O/P EST MOD 30 MIN: CPT | Performed by: STUDENT IN AN ORGANIZED HEALTH CARE EDUCATION/TRAINING PROGRAM

## 2025-03-04 PROCEDURE — 3078F DIAST BP <80 MM HG: CPT | Performed by: STUDENT IN AN ORGANIZED HEALTH CARE EDUCATION/TRAINING PROGRAM

## 2025-03-04 PROCEDURE — 99213 OFFICE O/P EST LOW 20 MIN: CPT | Performed by: STUDENT IN AN ORGANIZED HEALTH CARE EDUCATION/TRAINING PROGRAM

## 2025-03-04 PROCEDURE — 3074F SYST BP LT 130 MM HG: CPT | Performed by: STUDENT IN AN ORGANIZED HEALTH CARE EDUCATION/TRAINING PROGRAM

## 2025-03-04 ASSESSMENT — FIBROSIS 4 INDEX: FIB4 SCORE: 2.13

## 2025-03-04 NOTE — PROGRESS NOTES
[facility] Follow-up Visit    CC: JUAN JOSE follow up      HPI:  Jack Cui is a 70 y.o.male  with (43 pyh quit in 2015), COPD with emphysema, hypertension, CAD, JUAN JOSE who presents for JUAN JOSE follow-up    Last seen by me 1/8/2025 for auto CPAP compliance.  At that time felt that PAP pressure was not high enough and still was having residual headaches in the morning with some residual symptoms    Machine: AirSense 10 auto CPAP 6-10 cmH2O  Date: 2/2/2025 to 3/3/2025  Time used: 4 hours and 14 minutes of average usage, 60% usage over 4 hours in last 30 days  Mask: currently on N30  DME: [facility]  95th% Leak: 29.3  95th% pressure: 9.7  Residual AHI: 2.3    Sleep History  2/13/2024 -split-night PSG -AHI 13.2, jay SpO2 79%, 108.9 minutes of TST below 88%.  CPAP was initiated and tried from 6-9 cmH2O.     2/3/2025 titration PSG-CPAP and BiPAP were tried.  Supine REM seen on therapy patient did not meet criteria for sleep hypoventilation.  Patient did best on BiPAP therapy but did have residual hypoxemia.  Auto BiPAP 16/8, PS: 4 with 2 L/min supplemental O2 bleed in with heated tubing and proper mask fit was recommended.  Patient used medium AirFit N30 however due to mask leak switch to F30i    Patient Active Problem List    Diagnosis Date Noted    Screening for colorectal cancer 07/24/2024    Chronic respiratory failure with hypoxia (HCC) 06/20/2023    History of community acquired pneumonia 06/05/2023    Pulmonary emphysema (HCC) 04/18/2022    Coronary artery calcification 04/18/2022    Essential hypertension 08/03/2021    Dyslipidemia 08/03/2021       Past Medical History:   Diagnosis Date    Acute hypoxemic respiratory failure (HCC) 06/06/2023    JUAN CARLOS (acute kidney injury) (HCC) 06/05/2023    Back pain     Chest tightness     Constipation     Cough     Diarrhea     Dizziness     Frequent headaches     Hyperlipidemia     Hypertension     Morning headache     Ringing in ears     Sepsis with acute hypoxic  respiratory failure without septic shock (HCC) 2023    Shortness of breath     Sputum production     Wears glasses     Wheezing         History reviewed. No pertinent surgical history.    Family History   Problem Relation Age of Onset    Cancer Mother        Social History     Socioeconomic History    Marital status:      Spouse name: Not on file    Number of children: Not on file    Years of education: Not on file    Highest education level: Not on file   Occupational History    Not on file   Tobacco Use    Smoking status: Former     Current packs/day: 0.00     Average packs/day: 1 pack/day for 43.0 years (43.0 ttl pk-yrs)     Types: Cigarettes, Electronic Cigarettes     Start date: 1972     Quit date: 2015     Years since quittin.5    Smokeless tobacco: Former   Vaping Use    Vaping status: Former    Quit date: 2016    Passive vaping exposure: Yes   Substance and Sexual Activity    Alcohol use: Not Currently     Comment: No alcohol use for the last 39 years    Drug use: Never    Sexual activity: Not on file   Other Topics Concern    Not on file   Social History Narrative    Not on file     Social Drivers of Health     Financial Resource Strain: Low Risk  (2024)    Overall Financial Resource Strain (CARDIA)     Difficulty of Paying Living Expenses: Not hard at all   Food Insecurity: No Food Insecurity (2024)    Hunger Vital Sign     Worried About Running Out of Food in the Last Year: Never true     Ran Out of Food in the Last Year: Never true   Transportation Needs: No Transportation Needs (2024)    PRAPARE - Transportation     Lack of Transportation (Medical): No     Lack of Transportation (Non-Medical): No   Physical Activity: Not on file   Stress: Not on file   Social Connections: Not on file   Intimate Partner Violence: Not on file   Housing Stability: Not on file       Current Outpatient Medications   Medication Sig Dispense Refill    ALBERTINA HERNANDEZ 100-62.5-25  "MCG/ACT inhaler INHALE 1 PUFF BY MOUTH EVERY  Each 3    amLODIPine (Katerzia, Norvasc) 1 mg/mL oral suspension Take  by mouth every day. Shake well, room temperature      atorvastatin (LIPITOR) 40 MG Tab Take 1 Tablet by mouth every day. 90 Tablet 2    aspirin (ASPIRIN ADULT LOW STRENGTH) 81 MG EC tablet Take 1 Tablet by mouth every day. 90 Tablet 2    Guaifenesin 1200 MG TABLET SR 12 HR Take 1 Tablet by mouth every 12 hours. (Patient not taking: Reported on 1/10/2024) 28 Tablet 0    albuterol 108 (90 Base) MCG/ACT Aero Soln inhalation aerosol Inhale 1-2 Puffs every four hours as needed for Shortness of Breath. (Patient not taking: Reported on 1/10/2024) 1 Each 3     No current facility-administered medications for this visit.        ALLERGIES: Patient has no known allergies.    ROS  Constitutional: Denies fevers, Denies weight changes  Ears/Nose/Throat/Mouth: Denies nasal congestion or sore throat   Cardiovascular: Denies chest pain  Respiratory: Denies shortness of breath, Denies cough  Gastrointestinal/Hepatic: Denies nausea, vomiting  Sleep: see HPI      PHYSICAL EXAM  /78 (BP Location: Left arm, Patient Position: Sitting, BP Cuff Size: Large adult)   Pulse 87   Resp 16   Ht 1.803 m (5' 11\")   Wt 73.5 kg (162 lb)   SpO2 94%   BMI 22.59 kg/m²   Appearance: Well-nourished, well-developed, no acute distress  Eyes:  No scleral icterus , EOMI  Musculoskeletal:  Grossly normal; gait and station normal; digits and nails normal  Skin:  No rashes, petechiae, cyanosis  Neurologic: without focal signs; oriented to person, time, place, and purpose; judgement intact    Spirometry:  FVC is 2.44 L which is 67% predicted without a significant change postbronchodilator  FEV1 is 1.14 L which is 41% predicted without a significant change postbronchodilator  FEV1/FVC is 49     Lung volumes:  TLC is 6.37 L which is 90% predicted  RV is 3.77 L which is 154% predicted     Diffusion capacity:  DLCO is 12.24 which is " 47% predicted  DL/VA is 3.10 which is 83% predicted    Medical Decision Making   Assessment and Plan  JUAN JOSE/COPD overlap     The medical record was reviewed.  Diagnostic and titration nocturnal polysomnogram's, home sleep apnea tests, continuous nocturnal oximetry results, multiple sleep latency tests, and compliance reports reviewed.  Discussed sleep study results in detail. Patient open to initation of AutoBiPAP 16/8, PS:4 with 2 LPM oxygen bleed in.    PLAN:   -BiPAP with O2 bleed in ordered  -Order placed for mask and supplies   -Advised to reach out via MyChart with questions     Has been advised to initiate BiPAP, clean equipment frequently, and get new mask and supplies as allowed by insurance and DME. Recommend an earlier appointment, if significant treatment barriers develop.    Patients with JUAN JOSE are at increased risk of cardiovascular disease including coronary artery disease, systemic arterial hypertension, pulmonary arterial hypertension, cardiac arrythmias, and stroke. The patient was advised to avoid driving a motor vehicle when drowsy.    Positive airway pressure will favorably impact many of the adverse conditions and effects provoked by JUAN JOSE.    Have advised the patient to follow up with the appropriate healthcare practitioners for all other medical problems and issues.    Return in about 3 months (around 6/4/2025) for CPAP compliance.      Please note portions of this record was created using voice recognition software. I have made every reasonable attempt to correct obvious errors, but I expect that there are errors of grammar and possibly content I did not discover before finalizing the note.

## 2025-04-01 ENCOUNTER — APPOINTMENT (OUTPATIENT)
Dept: SLEEP MEDICINE | Facility: MEDICAL CENTER | Age: 70
End: 2025-04-01
Payer: MEDICARE

## 2025-04-01 VITALS
RESPIRATION RATE: 18 BRPM | HEIGHT: 71 IN | WEIGHT: 167 LBS | OXYGEN SATURATION: 91 % | DIASTOLIC BLOOD PRESSURE: 64 MMHG | SYSTOLIC BLOOD PRESSURE: 110 MMHG | BODY MASS INDEX: 23.38 KG/M2 | HEART RATE: 81 BPM

## 2025-04-01 DIAGNOSIS — J96.11 CHRONIC RESPIRATORY FAILURE WITH HYPOXIA (HCC): ICD-10-CM

## 2025-04-01 DIAGNOSIS — J43.9 PULMONARY EMPHYSEMA, UNSPECIFIED EMPHYSEMA TYPE (HCC): Primary | ICD-10-CM

## 2025-04-01 PROCEDURE — 99212 OFFICE O/P EST SF 10 MIN: CPT

## 2025-04-01 PROCEDURE — 99214 OFFICE O/P EST MOD 30 MIN: CPT

## 2025-04-01 PROCEDURE — 3074F SYST BP LT 130 MM HG: CPT

## 2025-04-01 PROCEDURE — 3078F DIAST BP <80 MM HG: CPT

## 2025-04-01 ASSESSMENT — ENCOUNTER SYMPTOMS
WHEEZING: 0
SHORTNESS OF BREATH: 0
FEVER: 0
STRIDOR: 0
PALPITATIONS: 0
COUGH: 0
HEARTBURN: 0
SPUTUM PRODUCTION: 0
CHILLS: 0
HEMOPTYSIS: 0
DIZZINESS: 0
WEIGHT LOSS: 0
DEPRESSION: 0

## 2025-04-01 ASSESSMENT — PATIENT HEALTH QUESTIONNAIRE - PHQ9: CLINICAL INTERPRETATION OF PHQ2 SCORE: 0

## 2025-04-01 ASSESSMENT — FIBROSIS 4 INDEX: FIB4 SCORE: 2.13

## 2025-04-01 NOTE — PROGRESS NOTES
Pulmonary Clinic follow up    Date of Service: 4/1/2025    Reason for follow up:  Follow-Up (10/10/23 V Jenny /3/4/25 Cuca )    History of Present Illness:     Jack Cui is a 70 y.o. male being evaluated in clinic today for f/u on COPD.  PMH includes HTN, HLD, CAD, JUAN JOSE on BiPAP, chronic respiratory failure on nocturnal oxygen.  Patient was last seen in pulmonary clinic by Dorothy RASHEED in 2023.  Patient is currently managed on Trelegy 100, and rarely uses his rescue inhaler, less than weekly.  Patient's oxygen saturations at home range from 91-94, but have a tendency to decrease to the need of oxygen replacement when using the treadmill or while fishing.  Patient would like the Xatori concentrator for exercise and activities such as fishing.  Patient's only COPD his ex exacerbation occurred in 2023, where he was hospitalized for Requiring IV antibiotics and steroids.  Patient denies any exacerbations last 12 months.  No no childhood respiratory illnesses.  Patient works for the [employer] doing [employer] for majority of his career, he was a  prior to that but used a respirator at all times.  No family history of autoimmune or lung disease.  Patient recently had a sleep study which suggested he switch to BiPAP therapy, still with 2 L of oxygen.  He has been on this treatment for the last few weeks and is tolerating well.    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   Respiratory:  Negative for cough, hemoptysis, sputum production, shortness of breath, wheezing and stridor.    Cardiovascular:  Negative for chest pain, palpitations and leg swelling.   Gastrointestinal:  Negative for heartburn.   Skin:  Negative for rash.   Neurological:  Negative for dizziness.   Psychiatric/Behavioral:  Negative for depression.      Current Outpatient Medications on File Prior to Visit   Medication Sig Dispense Refill    TRELEGY ELLIPTA 100-62.5-25 MCG/ACT inhaler INHALE 1 PUFF BY MOUTH EVERY  Each 3    amLODIPine (Katerzia, Norvasc) 1 mg/mL oral suspension Take  by mouth every day. Shake well, room temperature      atorvastatin (LIPITOR) 40 MG Tab Take 1 Tablet by mouth every day. 90 Tablet 2    aspirin (ASPIRIN ADULT LOW STRENGTH) 81 MG EC tablet Take 1 Tablet by mouth every day. 90 Tablet 2    Guaifenesin 1200 MG TABLET SR 12 HR Take 1 Tablet by mouth every 12 hours. (Patient not taking: Reported on 2025) 28 Tablet 0    albuterol 108 (90 Base) MCG/ACT Aero Soln inhalation aerosol Inhale 1-2 Puffs every four hours as needed for Shortness of Breath. (Patient not taking: Reported on 2025) 1 Each 3     No current facility-administered medications on file prior to visit.     Social History     Tobacco Use    Smoking status: Former     Current packs/day: 0.00     Average packs/day: 1 pack/day for 38.0 years (38.0 ttl pk-yrs)     Types: Cigarettes, Electronic Cigarettes     Start date: 1972     Quit date: 2010     Years since quittin.6    Smokeless tobacco: Former   Vaping Use    Vaping status: Former    Quit date: 2016    Passive vaping exposure: Yes   Substance Use Topics    Alcohol use: Not Currently     Comment: No alcohol use for the last 39 years    Drug use: Never      Past Medical History:   Diagnosis Date    Acute hypoxemic respiratory failure (HCC) 2023    JUAN CARLOS (acute  "kidney injury) ([facility]) 06/05/2023    Back pain     Chest tightness     Constipation     Cough     Diarrhea     Dizziness     Frequent headaches     Hyperlipidemia     Hypertension     Morning headache     Ringing in ears     Sepsis with acute hypoxic respiratory failure without septic shock ([facility]) 06/05/2023    Shortness of breath     Sputum production     Wears glasses     Wheezing      No past surgical history on file.  Patient has no known allergies.  Family History   Problem Relation Age of Onset    Cancer Mother      Ambulatory Vitals  Encounter Vitals  Blood Pressure : 110/64  Pulse: 81  Respiration: 18  Pulse Oximetry: 91 %  Weight: 75.8 kg (167 lb)  Height: 180.3 cm (5' 11\")  BMI (Calculated): 23.29     Physical Exam  Constitutional:       General: He is not in acute distress.  HENT:      Head: Normocephalic.      Nose: Nose normal.      Mouth/Throat:      Mouth: Mucous membranes are moist.   Eyes:      Conjunctiva/sclera: Conjunctivae normal.   Cardiovascular:      Rate and Rhythm: Normal rate and regular rhythm.      Heart sounds: No murmur heard.  Pulmonary:      Effort: Pulmonary effort is normal. No respiratory distress.      Breath sounds: Normal breath sounds. No wheezing.   Abdominal:      General: There is no distension.   Musculoskeletal:      Right lower leg: No edema.      Left lower leg: No edema.   Skin:     General: Skin is warm and dry.      Capillary Refill: Capillary refill takes less than 2 seconds.      Nails: There is no clubbing.   Neurological:      General: No focal deficit present.      Mental Status: He is alert and oriented to person, place, and time.   Psychiatric:         Mood and Affect: Mood normal.       Results:    PFT 7/13/2023:    Results:  Spirometry:  FVC is 2.44 L which is 67% predicted without a significant change postbronchodilator  FEV1 is 1.14 L which is 41% predicted without a significant change postbronchodilator  FEV1/FVC is 49     Lung volumes:  TLC is 6.37 L which " is 90% predicted  RV is 3.77 L which is 154% predicted     Diffusion capacity:  DLCO is 12.24 which is 47% predicted  DL/VA is 3.10 which is 83% predicted     Interpretation:  1.  Spirometry is significant for a severe obstruction  2.  There is no significant change postbronchodilator.  There is a suggestion of bronchial reactivity in the mid flow values which can be seen in reactive airways disease, correlate clinically  3.  The flow-volume loop is consistent with the spirometry data  4.  Lung volumes demonstrate air trapping without hyperinflation  5.  The diffusion capacity is moderately decreased and does not fully correct for alveolar volume  6.  In comparison to the prior study from 3/21/2022 the FEV1 has declined from 1.21 1.14 L.    CT chest 6/5/2023:    IMPRESSION:     1.  Right lower lobe pneumonia.  2.  Emphysematous changes.  3.  No pleural effusion.  4.  Extensive coronary calcification.    Echocardiogram 6/7/2023:    CONCLUSIONS  Prior study 11/16/21, compared to the report of the prior study, there   has been no significant change.   Normal left ventricular systolic function.  The left ventricular ejection fraction is visually estimated to be 65%.  No significant valvular abnormalities.      PSG 2/3/2025:     Assessment:   Mild Obstructive Sleep Apnea Hypopnea - AHI 10.11 with severe Nocturnal desaturation - jay saturation 78% - saturations <88% below for 229.0 minutes of TST.  Impression:  CPAP and BiPAP were tried  Prolonged SOL, increased WASO  Supine REM seen on therapy  Patient did not meet criteria for sleep hypoventilation  Patient did best on BiPAP therapy  Despite control of obstructive events, patient had residual hypoxemia  Recommendation:   autoBiPAP 16/8, PS:4 with 2LPM supplemental O2 bleed in with heated tubing and proper mask fit  Patient used M airfit N30, however due to mask leak, swtiched F30i    Vaccinations:    Covid: complete  Flu: complete  Pneumonia: complete   RSV: complete       Assessment & Plan  Pulmonary emphysema, unspecified emphysema type (HCC)  Chronic 2/2 smoking history  Stable     GOLD Stage: A  MMRC Grade: 0-1  Exacerbations in the last year:   Change in dyspnea, sputum, or cough:     Routine Tx: Trelegy 100  PRN Tx: Albuterol   Encouraged regular exercise as tolerated  Annual covid and influenza vaccinations encouraged, wash hands regularly to prevent the spread of infection  Monitor for hypoxemia of oxygen <88% at home and schedule visit with any changes  Use air purifier with HEPA filters if available    Notify clinic with any early signs of exacerbation including change in sputum production or color and increased dyspnea.          Chronic respiratory failure with hypoxia (HCC)  Oxygen at 2 LPM nocturnal, sometimes with exertion  Chronic hypoxia 2/2 smoking history, emphysema   Patient is compliant with treatment.   Patient is benefiting from treatment.     Orders:    DME O2 New Set Up      Return in about 1 year (around 4/1/2026), or if symptoms worsen or fail to improve, for f/u on COPD .     This note was generated using voice recognition software which has a chance of producing errors of grammar and possibly content.  I have made every reasonable attempt to find and correct any obvious errors, but it should be expected that some may not be found prior to finalization of this note.    Time spent in record review prior to patient arrival, reviewing results, and in face-to-face encounter totaled 38 min, excluding any procedures if performed.    Henrry RASHEED  [facility]

## 2025-04-01 NOTE — ASSESSMENT & PLAN NOTE
Chronic 2/2 smoking history  Stable     GOLD Stage: A  MMRC Grade: 0-1  Exacerbations in the last year:   Change in dyspnea, sputum, or cough:     Routine Tx: Trelegy 100  PRN Tx: Albuterol   Encouraged regular exercise as tolerated  Annual covid and influenza vaccinations encouraged, wash hands regularly to prevent the spread of infection  Monitor for hypoxemia of oxygen <88% at home and schedule visit with any changes  Use air purifier with HEPA filters if available    Notify clinic with any early signs of exacerbation including change in sputum production or color and increased dyspnea.

## 2025-04-01 NOTE — ASSESSMENT & PLAN NOTE
Oxygen at 2 LPM nocturnal, sometimes with exertion  Chronic hypoxia 2/2 smoking history, emphysema   Patient is compliant with treatment.   Patient is benefiting from treatment.     Orders:    DME O2 New Set Up

## 2025-04-14 ENCOUNTER — HOSPITAL ENCOUNTER (OUTPATIENT)
Dept: LAB | Facility: MEDICAL CENTER | Age: 70
End: 2025-04-14
Attending: FAMILY MEDICINE
Payer: MEDICARE

## 2025-04-14 PROCEDURE — 80053 COMPREHEN METABOLIC PANEL: CPT

## 2025-04-14 PROCEDURE — 84153 ASSAY OF PSA TOTAL: CPT

## 2025-04-14 PROCEDURE — 84080 ASSAY ALKALINE PHOSPHATASES: CPT

## 2025-04-14 PROCEDURE — 36415 COLL VENOUS BLD VENIPUNCTURE: CPT

## 2025-04-14 PROCEDURE — 80061 LIPID PANEL: CPT

## 2025-04-14 PROCEDURE — 84075 ASSAY ALKALINE PHOSPHATASE: CPT

## 2025-04-15 LAB
ALBUMIN SERPL BCP-MCNC: 4.5 G/DL (ref 3.2–4.9)
ALBUMIN/GLOB SERPL: 1.4 G/DL
ALP SERPL-CCNC: 166 U/L (ref 30–99)
ALT SERPL-CCNC: 31 U/L (ref 2–50)
ANION GAP SERPL CALC-SCNC: 12 MMOL/L (ref 7–16)
AST SERPL-CCNC: 37 U/L (ref 12–45)
BILIRUB SERPL-MCNC: 1 MG/DL (ref 0.1–1.5)
BUN SERPL-MCNC: 12 MG/DL (ref 8–22)
CALCIUM ALBUM COR SERPL-MCNC: 9.2 MG/DL (ref 8.5–10.5)
CALCIUM SERPL-MCNC: 9.6 MG/DL (ref 8.5–10.5)
CHLORIDE SERPL-SCNC: 106 MMOL/L (ref 96–112)
CHOLEST SERPL-MCNC: 147 MG/DL (ref 100–199)
CO2 SERPL-SCNC: 21 MMOL/L (ref 20–33)
CREAT SERPL-MCNC: 1.17 MG/DL (ref 0.5–1.4)
GFR SERPLBLD CREATININE-BSD FMLA CKD-EPI: 67 ML/MIN/1.73 M 2
GLOBULIN SER CALC-MCNC: 3.3 G/DL (ref 1.9–3.5)
GLUCOSE SERPL-MCNC: 87 MG/DL (ref 65–99)
HDLC SERPL-MCNC: 69 MG/DL
LDLC SERPL CALC-MCNC: 67 MG/DL
POTASSIUM SERPL-SCNC: 4.5 MMOL/L (ref 3.6–5.5)
PROT SERPL-MCNC: 7.8 G/DL (ref 6–8.2)
PSA SERPL DL<=0.01 NG/ML-MCNC: 1.05 NG/ML (ref 0–4)
SODIUM SERPL-SCNC: 139 MMOL/L (ref 135–145)
TRIGL SERPL-MCNC: 55 MG/DL (ref 0–149)

## 2025-04-17 LAB
ALP BONE SERPL-CCNC: 91 U/L (ref 0–55)
ALP ISOS SERPL HS-CCNC: 0 U/L
ALP LIVER SERPL-CCNC: 107 U/L (ref 0–94)
ALP SERPL-CCNC: 198 U/L (ref 40–120)

## 2025-07-14 DIAGNOSIS — J43.9 PULMONARY EMPHYSEMA, UNSPECIFIED EMPHYSEMA TYPE (HCC): ICD-10-CM

## 2025-07-14 RX ORDER — FLUTICASONE FUROATE, UMECLIDINIUM BROMIDE AND VILANTEROL TRIFENATATE 100; 62.5; 25 UG/1; UG/1; UG/1
1 POWDER RESPIRATORY (INHALATION)
Qty: 180 EACH | Refills: 3 | Status: SHIPPED | OUTPATIENT
Start: 2025-07-14

## 2025-07-14 NOTE — TELEPHONE ENCOUNTER
Have we ever prescribed this med? Yes.  If yes, what date? 10/08/24    Last OV: 04/01/25 with Henrry RASHEED     Next OV: No Pending appt.     DX: pulmonary Emphysema    Medications:   Requested Prescriptions     Pending Prescriptions Disp Refills    TRELEGY ELLIPTA 100-62.5-25 MCG/ACT inhaler [Pharmacy Med Name: TRELEGY ELLIPTA 100-62.5MCG INH 30P] 180 Each 3     Sig: INHALE 1 PUFF BY MOUTH EVERY DAY